# Patient Record
Sex: FEMALE | Race: WHITE | HISPANIC OR LATINO | URBAN - METROPOLITAN AREA
[De-identification: names, ages, dates, MRNs, and addresses within clinical notes are randomized per-mention and may not be internally consistent; named-entity substitution may affect disease eponyms.]

---

## 2024-06-24 ENCOUNTER — APPOINTMENT (OUTPATIENT)
Dept: OBGYN | Facility: CLINIC | Age: 39
End: 2024-06-24

## 2024-06-24 ENCOUNTER — INITIAL PRENATAL (OUTPATIENT)
Dept: OBGYN | Facility: CLINIC | Age: 39
End: 2024-06-24
Payer: MEDICAID

## 2024-06-24 ENCOUNTER — HOSPITAL ENCOUNTER (OUTPATIENT)
Facility: MEDICAL CENTER | Age: 39
End: 2024-06-24
Attending: PHYSICIAN ASSISTANT
Payer: MEDICAID

## 2024-06-24 ENCOUNTER — APPOINTMENT (OUTPATIENT)
Dept: RADIOLOGY | Facility: IMAGING CENTER | Age: 39
End: 2024-06-24
Attending: PHYSICIAN ASSISTANT

## 2024-06-24 ENCOUNTER — HOSPITAL ENCOUNTER (OUTPATIENT)
Facility: MEDICAL CENTER | Age: 39
End: 2024-06-24
Attending: OBSTETRICS & GYNECOLOGY | Admitting: OBSTETRICS & GYNECOLOGY
Payer: MEDICAID

## 2024-06-24 VITALS
WEIGHT: 173.8 LBS | BODY MASS INDEX: 34.12 KG/M2 | HEIGHT: 60 IN | SYSTOLIC BLOOD PRESSURE: 86 MMHG | DIASTOLIC BLOOD PRESSURE: 54 MMHG

## 2024-06-24 DIAGNOSIS — Z87.59 HISTORY OF NEONATAL DEATH: ICD-10-CM

## 2024-06-24 DIAGNOSIS — O09.521 ADVANCED MATERNAL AGE IN MULTIGRAVIDA, FIRST TRIMESTER: ICD-10-CM

## 2024-06-24 DIAGNOSIS — F32.A ANXIETY AND DEPRESSION: ICD-10-CM

## 2024-06-24 DIAGNOSIS — F41.9 ANXIETY AND DEPRESSION: ICD-10-CM

## 2024-06-24 DIAGNOSIS — N91.2 AMENORRHEA: ICD-10-CM

## 2024-06-24 DIAGNOSIS — Z98.891 HISTORY OF C-SECTION: ICD-10-CM

## 2024-06-24 DIAGNOSIS — Z34.82 ENCOUNTER FOR SUPERVISION OF OTHER NORMAL PREGNANCY, SECOND TRIMESTER: ICD-10-CM

## 2024-06-24 PROCEDURE — 87480 CANDIDA DNA DIR PROBE: CPT

## 2024-06-24 PROCEDURE — 3074F SYST BP LT 130 MM HG: CPT | Performed by: PHYSICIAN ASSISTANT

## 2024-06-24 PROCEDURE — 87491 CHLMYD TRACH DNA AMP PROBE: CPT

## 2024-06-24 PROCEDURE — 87660 TRICHOMONAS VAGIN DIR PROBE: CPT

## 2024-06-24 PROCEDURE — 76801 OB US < 14 WKS SINGLE FETUS: CPT | Mod: TC | Performed by: PHYSICIAN ASSISTANT

## 2024-06-24 PROCEDURE — 87591 N.GONORRHOEAE DNA AMP PROB: CPT

## 2024-06-24 PROCEDURE — 87624 HPV HI-RISK TYP POOLED RSLT: CPT

## 2024-06-24 PROCEDURE — 0500F INITIAL PRENATAL CARE VISIT: CPT | Performed by: PHYSICIAN ASSISTANT

## 2024-06-24 PROCEDURE — 3078F DIAST BP <80 MM HG: CPT | Performed by: PHYSICIAN ASSISTANT

## 2024-06-24 PROCEDURE — 88175 CYTOPATH C/V AUTO FLUID REDO: CPT

## 2024-06-24 PROCEDURE — 87510 GARDNER VAG DNA DIR PROBE: CPT

## 2024-06-24 ASSESSMENT — EDINBURGH POSTNATAL DEPRESSION SCALE (EPDS)
I HAVE BLAMED MYSELF UNNECESSARILY WHEN THINGS WENT WRONG: YES, MOST OF THE TIME
I HAVE BEEN SO UNHAPPY THAT I HAVE HAD DIFFICULTY SLEEPING: NOT AT ALL
I HAVE LOOKED FORWARD WITH ENJOYMENT TO THINGS: AS MUCH AS I EVER DID
I HAVE FELT SAD OR MISERABLE: NO, NOT AT ALL
I HAVE BEEN ABLE TO LAUGH AND SEE THE FUNNY SIDE OF THINGS: NOT QUITE SO MUCH NOW
TOTAL SCORE: 10
THE THOUGHT OF HARMING MYSELF HAS OCCURRED TO ME: NEVER
I HAVE FELT SCARED OR PANICKY FOR NO GOOD REASON: YES, QUITE A LOT
THINGS HAVE BEEN GETTING ON TOP OF ME: NO, MOST OF THE TIME I HAVE COPED QUITE WELL
I HAVE BEEN SO UNHAPPY THAT I HAVE BEEN CRYING: NO, NEVER
I HAVE BEEN ANXIOUS OR WORRIED FOR NO GOOD REASON: YES, SOMETIMES

## 2024-06-24 ASSESSMENT — ENCOUNTER SYMPTOMS: DEPRESSION: 0

## 2024-06-24 NOTE — PROGRESS NOTES
Pt. Here for NOB visit.  # [phone] (home)   LMP 4/24/2024  Last pap smear 2/2024 WNL in [place].   Pt had U/S today   Pt states having nausea,vomiting, and a yellowish discharge with itching.   EPDS : 10

## 2024-06-24 NOTE — PROGRESS NOTES
"Subjective     Bren Gonzalez is a 38 y.o. female who presents with New ob visit. Pt is unsure of LMP but sure she conceived 4/6/24. Dating is by US today - STEPH 12/30/24. Different FOB than 1st pregnancy, this FOB not involved but pt very happy with pregnancy.  OBHx: Hx term primary C/S for \"recommendation by doctor based on baby's weight\" but pt unsure if she was large or small. No trial of labor performed. In [place], no records as 2016. Baby did well then 3 days after delivery, she had issues, was re-admitted and passed away 6 weeks later. Pt unsure why the baby passed - likely cardiac, though she also had \"convulsions and renal issues.\" Pt unable to get records, but will see MFM. Denies GDM, PIH or delivery complications.   PMHx: Denies though some anxiety surrounding this pregnancy  SHX: C/S x 1  Allergies: NKDA  Social: Denies tob, etoh or drug use   Meds; Taking PNV - recommended DHA as well, consider ASA 81mg daily for preeclampsia prophylaxis  Pt currently denies cramping, bleeding or pain. + but small FM.             HPI    Review of Systems   Psychiatric/Behavioral:  Negative for depression.    All other systems reviewed and are negative.             Objective     BP (!) 86/54   Ht 5'   Wt 173 lb 12.8 oz   LMP 04/24/2024 (Approximate)   BMI 33.94 kg/m²      Physical Exam  Vitals reviewed.   Constitutional:       Appearance: She is well-developed.   HENT:      Head: Normocephalic and atraumatic.   Eyes:      Pupils: Pupils are equal, round, and reactive to light.   Neck:      Thyroid: No thyromegaly.   Cardiovascular:      Rate and Rhythm: Normal rate and regular rhythm.      Heart sounds: Normal heart sounds.   Pulmonary:      Effort: Pulmonary effort is normal. No respiratory distress.      Breath sounds: Normal breath sounds.   Abdominal:      General: Bowel sounds are normal. There is no distension.      Palpations: Abdomen is soft.      Tenderness: There is no abdominal tenderness. " "  Genitourinary:     Exam position: Supine.      Labia:         Right: No rash or tenderness.         Left: No rash or tenderness.       Vagina: Normal. No signs of injury and foreign body. No vaginal discharge or erythema.      Cervix: No cervical motion tenderness.      Uterus: Enlarged (Gravid, uterus c/w 13wk size). Not deviated and not tender.       Adnexa:         Right: No mass or tenderness.          Left: No mass or tenderness.     Musculoskeletal:      Cervical back: Normal range of motion and neck supple.   Skin:     General: Skin is warm and dry.      Findings: No erythema.   Neurological:      Mental Status: She is alert.      Deep Tendon Reflexes: Reflexes are normal and symmetric.   Psychiatric:         Behavior: Behavior normal.         Thought Content: Thought content normal.                             Assessment & Plan        1. Advanced maternal age in multigravida, first trimester  - US today wnl, f/u 4 wk, to see MFM for US  - PREG CNTR PRENATAL PN; Future  - THINPREP PAP W/HPV AND CTNG; Future  - AFP MATERNAL SERUM ALPHA-FETOPROTEIN; Future - to do at 15wk  - Referral to Perinatology    2. History of infant death at 6 wk old in [place] - ?epilepsy, cardiac and renal issues - to see MFM  - Seeing MFM, unsure why    3. Anxiety and depression - related to death of 1st child    4. History of C/S x 1 - in [place], recommended for \"baby's weight\" but unsure birth weight  - Considering TOLAC, vs repeat, may want BTL/BS - to see MD for counseling    5. Encounter for supervision of other normal pregnancy, second trimester  - [facility] PRENATAL TEST                  "

## 2024-06-25 DIAGNOSIS — O09.521 ADVANCED MATERNAL AGE IN MULTIGRAVIDA, FIRST TRIMESTER: ICD-10-CM

## 2024-06-25 LAB
CANDIDA DNA VAG QL PROBE+SIG AMP: NEGATIVE
G VAGINALIS DNA VAG QL PROBE+SIG AMP: POSITIVE
T VAGINALIS DNA VAG QL PROBE+SIG AMP: NEGATIVE

## 2024-06-25 RX ORDER — METRONIDAZOLE 500 MG/1
500 TABLET ORAL 2 TIMES DAILY
Qty: 14 TABLET | Refills: 0 | Status: SHIPPED | OUTPATIENT
Start: 2024-06-25 | End: 2024-07-02

## 2024-06-26 ENCOUNTER — TELEPHONE (OUTPATIENT)
Dept: OBGYN | Facility: CLINIC | Age: 39
End: 2024-06-26
Payer: MEDICAID

## 2024-06-26 NOTE — TELEPHONE ENCOUNTER
----- Message from LOUISE Nixon sent at 6/25/2024 10:23 PM PDT -----  Flagyl 500mg BID x 7 days rx called in for BV infection      06/26/24  1457 Left message for pt to call back regarding lab results.  6/27/2024  1430 Left message for pt to call back regarding lab results.   06/28/24  1053 Called pt and informed her test came back positive for BV, explained this is not an STI. Pt informed she needs to start antibiotics. Should take the full Tx and advised to take meds with food but not with milk and to avoid alcohol and intercourse while on Tx. Pharmacy verified with pt. Pt agreed and verbalized understanding.     Pt stated she has not been able to sleep and feels like can't breath and palpitation but she has been active. Pt unsure if these are anxiety attacks. Consulted with Adrienne Rizo CNM and stated she will send Rx for Vistaril to pt's pharmacy and this will also help her sleep. Pt notified, agreed and verbalized understanding.

## 2024-06-28 LAB
C TRACH RRNA CVX QL NAA+PROBE: NEGATIVE
CYTOLOGIST CVX/VAG CYTO: NORMAL
CYTOLOGY CVX/VAG DOC CYTO: NORMAL
CYTOLOGY CVX/VAG DOC THIN PREP: NORMAL
HPV I/H RISK 4 DNA CVX QL PROBE+SIG AMP: NEGATIVE
N GONORRHOEA RRNA CVX QL NAA+PROBE: NEGATIVE
NOTE NL11727A: NORMAL
OTHER STN SPEC: NORMAL
STAT OF ADQ CVX/VAG CYTO-IMP: NORMAL

## 2024-06-28 RX ORDER — HYDROXYZINE PAMOATE 25 MG/1
25 CAPSULE ORAL 3 TIMES DAILY PRN
Qty: 90 CAPSULE | Refills: 1 | Status: SHIPPED | OUTPATIENT
Start: 2024-06-28

## 2024-07-24 PROBLEM — O09.522 ADVANCED MATERNAL AGE IN MULTIGRAVIDA, SECOND TRIMESTER: Status: ACTIVE | Noted: 2024-06-24

## 2024-07-29 ENCOUNTER — APPOINTMENT (OUTPATIENT)
Dept: OBGYN | Facility: CLINIC | Age: 39
End: 2024-07-29
Payer: MEDICAID

## 2024-08-05 ENCOUNTER — ANCILLARY PROCEDURE (OUTPATIENT)
Dept: MATERNAL FETAL MEDICINE | Facility: MEDICAL CENTER | Age: 39
End: 2024-08-05
Attending: OBSTETRICS & GYNECOLOGY
Payer: MEDICAID

## 2024-08-05 ENCOUNTER — OFFICE VISIT (OUTPATIENT)
Dept: MATERNAL FETAL MEDICINE | Facility: MEDICAL CENTER | Age: 39
End: 2024-08-05
Payer: MEDICAID

## 2024-08-05 VITALS
HEART RATE: 90 BPM | BODY MASS INDEX: 34.88 KG/M2 | SYSTOLIC BLOOD PRESSURE: 105 MMHG | DIASTOLIC BLOOD PRESSURE: 70 MMHG | WEIGHT: 178.6 LBS

## 2024-08-05 DIAGNOSIS — O09.522 ADVANCED MATERNAL AGE IN MULTIGRAVIDA, SECOND TRIMESTER: ICD-10-CM

## 2024-08-05 DIAGNOSIS — O09.292 CURRENT PREGNANCY IN SECOND TRIMESTER WITH HISTORY OF NEONATAL DEATH DURING PRIOR PREGNANCY: ICD-10-CM

## 2024-08-05 DIAGNOSIS — Z82.79 FAMILY HISTORY OF BIRTH DEFECTS: ICD-10-CM

## 2024-08-05 DIAGNOSIS — Z82.79 FAMILY HISTORY OF CONGENITAL HEART DEFECT: ICD-10-CM

## 2024-08-05 DIAGNOSIS — O35.BXX0 ANOMALY OF HEART OF FETUS AFFECTING PREGNANCY, ANTEPARTUM, SINGLE OR UNSPECIFIED FETUS: ICD-10-CM

## 2024-08-05 DIAGNOSIS — O09.521 ADVANCED MATERNAL AGE IN MULTIGRAVIDA, FIRST TRIMESTER: ICD-10-CM

## 2024-08-05 DIAGNOSIS — Z3A.19 19 WEEKS GESTATION OF PREGNANCY: ICD-10-CM

## 2024-08-05 PROCEDURE — 76817 TRANSVAGINAL US OBSTETRIC: CPT | Performed by: OBSTETRICS & GYNECOLOGY

## 2024-08-05 PROCEDURE — 99203 OFFICE O/P NEW LOW 30 MIN: CPT | Performed by: OBSTETRICS & GYNECOLOGY

## 2024-08-05 PROCEDURE — 76811 OB US DETAILED SNGL FETUS: CPT | Performed by: OBSTETRICS & GYNECOLOGY

## 2024-08-08 ENCOUNTER — TELEPHONE (OUTPATIENT)
Dept: OBGYN | Facility: CLINIC | Age: 39
End: 2024-08-08
Payer: MEDICAID

## 2024-08-08 NOTE — TELEPHONE ENCOUNTER
Caller Name: Radha   Call Back Number: [phone]    Pt's family member, Radha, who claims to be the pt's niece, called with complaints about a visit the pt recently had at [facility]. During this call, the pt was with the caller to explain the situation while the caller translated. The pt c/o of her last US appt on 8/5 with Dr. Ware. She states that during this visit they a pelvic US when she was 19w, and was uncomfortable. She also states that after this visit she has been having more lower abd pain. She was also requesting to have her medical records transferred to another clinic. I let her know that I will escalate this situation to the  and gave her instructions on transferring medical records. As for the pain, I advised that she alternates tylenol and ibuprofen prn and stay hydrated. Pt verbalized understanding and call ended.

## 2024-08-27 ENCOUNTER — TELEPHONE (OUTPATIENT)
Dept: MATERNAL FETAL MEDICINE | Facility: MEDICAL CENTER | Age: 39
End: 2024-08-27
Payer: MEDICAID

## 2024-10-07 ENCOUNTER — APPOINTMENT (OUTPATIENT)
Dept: OBGYN | Facility: CLINIC | Age: 39
End: 2024-10-07
Payer: MEDICAID

## 2024-11-20 PROBLEM — R73.09 ELEVATED GLUCOSE TOLERANCE TEST: Status: ACTIVE | Noted: 2024-11-20

## 2024-12-03 PROBLEM — O24.410 DIET CONTROLLED GESTATIONAL DIABETES MELLITUS (GDM) IN THIRD TRIMESTER: Status: ACTIVE | Noted: 2024-12-03

## 2024-12-03 PROBLEM — O24.913 DIABETES MELLITUS AFFECTING PREGNANCY IN THIRD TRIMESTER: Status: ACTIVE | Noted: 2024-12-03

## 2024-12-03 PROBLEM — O34.219 PREVIOUS CESAREAN DELIVERY AFFECTING PREGNANCY: Status: ACTIVE | Noted: 2024-12-03

## 2024-12-19 PROBLEM — O24.419 GESTATIONAL DIABETES MELLITUS (GDM), ANTEPARTUM, GESTATIONAL DIABETES METHOD OF CONTROL UNSPECIFIED: Status: ACTIVE | Noted: 2024-12-19

## 2024-12-21 PROBLEM — O24.410 DIET CONTROLLED GESTATIONAL DIABETES MELLITUS (GDM) IN THIRD TRIMESTER: Status: RESOLVED | Noted: 2024-12-03 | Resolved: 2024-12-21
